# Patient Record
Sex: MALE | Employment: OTHER | URBAN - METROPOLITAN AREA
[De-identification: names, ages, dates, MRNs, and addresses within clinical notes are randomized per-mention and may not be internally consistent; named-entity substitution may affect disease eponyms.]

---

## 2023-10-12 ENCOUNTER — OFFICE VISIT (OUTPATIENT)
Dept: URGENT CARE | Facility: CLINIC | Age: 38
End: 2023-10-12

## 2023-10-12 ENCOUNTER — TELEPHONE (OUTPATIENT)
Dept: OPHTHALMOLOGY | Facility: CLINIC | Age: 38
End: 2023-10-12

## 2023-10-12 VITALS
OXYGEN SATURATION: 97 % | DIASTOLIC BLOOD PRESSURE: 99 MMHG | HEIGHT: 70 IN | BODY MASS INDEX: 26.2 KG/M2 | WEIGHT: 183 LBS | SYSTOLIC BLOOD PRESSURE: 151 MMHG | RESPIRATION RATE: 16 BRPM | HEART RATE: 87 BPM | TEMPERATURE: 99 F

## 2023-10-12 DIAGNOSIS — B02.39 SHINGLES OF EYELID: Primary | ICD-10-CM

## 2023-10-12 PROCEDURE — 99214 OFFICE O/P EST MOD 30 MIN: CPT | Mod: TIER,S$GLB,, | Performed by: PHYSICIAN ASSISTANT

## 2023-10-12 PROCEDURE — 99214 PR OFFICE/OUTPT VISIT, EST, LEVL IV, 30-39 MIN: ICD-10-PCS | Mod: TIER,S$GLB,, | Performed by: PHYSICIAN ASSISTANT

## 2023-10-12 RX ORDER — VALACYCLOVIR HYDROCHLORIDE 1 G/1
1000 TABLET, FILM COATED ORAL 3 TIMES DAILY
Qty: 21 TABLET | Refills: 0 | Status: SHIPPED | OUTPATIENT
Start: 2023-10-12 | End: 2023-10-19

## 2023-10-12 NOTE — PROGRESS NOTES
"Subjective:      Patient ID: Yolie Oswald is a 38 y.o. male.    Vitals:  height is 5' 10" (1.778 m) and weight is 83 kg (182 lb 15.7 oz). His oral temperature is 99 °F (37.2 °C). His blood pressure is 151/99 (abnormal) and his pulse is 87. His respiration is 16 and oxygen saturation is 97%.     Chief Complaint: Eye Problem    Pt complains of left eye pain that started on Sunday. Pt anti-itch cream.  Patient has new vesicles presenting and describes as a burning pain.  He is unable to speak English and is using family member as  does not want to use the language services.     Eye Problem   The left eye is affected. This is a new problem. The current episode started in the past 7 days. The problem occurs constantly. The problem has been unchanged. There was no injury mechanism. The pain is at a severity of 6/10. The pain is moderate. There is No known exposure to pink eye. He Does not wear contacts. Associated symptoms include blurred vision, eye redness and itching. Pertinent negatives include no eye discharge, double vision, fever or photophobia. He has tried nothing for the symptoms. The treatment provided no relief.       Constitution: Negative for chills and fever.   HENT:  Negative for ear pain, ear discharge, tinnitus, hearing loss, dental problem, drooling, mouth sores, tongue pain, tongue lesion, congestion, postnasal drip, sinus pain, sinus pressure, sore throat and trouble swallowing.    Neck: Negative for neck pain.   Cardiovascular:  Negative for chest pain.   Eyes:  Positive for eye itching, eye pain, eye redness, blurred vision and eyelid swelling. Negative for eye trauma, foreign body in eye, eye discharge, photophobia, vision loss and double vision.   Respiratory:  Negative for shortness of breath.    Skin:  Positive for rash and erythema. Negative for wound and bruising.   Neurological:  Negative for dizziness and headaches.    History reviewed. No pertinent past medical " history.    History reviewed. No pertinent surgical history.    History reviewed. No pertinent family history.    Social History     Socioeconomic History    Marital status: Single       Current Outpatient Medications   Medication Sig Dispense Refill    valACYclovir (VALTREX) 1000 MG tablet Take 1 tablet (1,000 mg total) by mouth 3 (three) times daily. for 7 days 21 tablet 0     No current facility-administered medications for this visit.       Review of patient's allergies indicates:  No Known Allergies    Objective:     Physical Exam   Constitutional: He is oriented to person, place, and time. He appears well-developed. He is cooperative.  Non-toxic appearance. He does not appear ill. No distress.   HENT:   Head: Normocephalic and atraumatic.   Ears:   Right Ear: Hearing, tympanic membrane, external ear and ear canal normal. impacted cerumen  Left Ear: Hearing, tympanic membrane and external ear normal. impacted cerumen  Nose: Nose normal. No mucosal edema, rhinorrhea, nasal deformity or congestion. No epistaxis. Right sinus exhibits no maxillary sinus tenderness and no frontal sinus tenderness. Left sinus exhibits no maxillary sinus tenderness and no frontal sinus tenderness.   Mouth/Throat: Uvula is midline, oropharynx is clear and moist and mucous membranes are normal. No trismus in the jaw. Normal dentition. No uvula swelling. No oropharyngeal exudate, posterior oropharyngeal edema or posterior oropharyngeal erythema.   Erythema noted in the left ear canal no vesicles patient states it is not painful.      Comments: Erythema noted in the left ear canal no vesicles patient states it is not painful.  Eyes: Pupils are equal, round, and reactive to light. No visual field deficit is present. Right eye exhibits no chemosis, no discharge, no exudate and no hordeolum. No foreign body present in the right eye. Left eye exhibits no chemosis, no discharge, no exudate and no hordeolum. No foreign body present in the left  eye. Right conjunctiva is not injected. Right conjunctiva has no hemorrhage. Left conjunctiva is injected. Left conjunctiva has no hemorrhage. No scleral icterus. Right pupil is round, reactive and not sluggish. Left pupil is round, reactive and not sluggish. Extraocular movement intact periorbital hyperpigmentation     Comments: Patient is unable to keep eyes open or tolerate the  funduscopic exam.   Neck: Trachea normal and phonation normal. Neck supple. No edema present. No erythema present. No neck rigidity present.   Cardiovascular: Normal rate, regular rhythm, normal heart sounds and normal pulses.   No murmur heard.Exam reveals no gallop and no friction rub.   Pulmonary/Chest: Effort normal and breath sounds normal. No stridor. No respiratory distress. He has no decreased breath sounds. He has no wheezes. He has no rhonchi. He has no rales.   Abdominal: Normal appearance.   Musculoskeletal:      Cervical back: He exhibits no tenderness.   Lymphadenopathy:     He has no cervical adenopathy.   Neurological: He is alert and oriented to person, place, and time. He displays no weakness. No cranial nerve deficit or sensory deficit. He exhibits normal muscle tone. Coordination normal.   Skin: Skin is warm, dry, intact, not diaphoretic, not pale, rash and vesicular. erythema No bruising        Psychiatric: His speech is normal and behavior is normal. Mood, judgment and thought content normal.   Nursing note and vitals reviewed.        Assessment:     1. Shingles of eyelid        Plan:       Shingles of eyelid  -     valACYclovir (VALTREX) 1000 MG tablet; Take 1 tablet (1,000 mg total) by mouth 3 (three) times daily. for 7 days  Dispense: 21 tablet; Refill: 0  -     Ambulatory referral/consult to Ophthalmology    I have reviewed the patient chart and pertinent past imaging/labs.  I called ophthalmology triage nurse and spoke with her patient is going to be seen at 1:00 p.m. for eye exam.  Family understands the  seriousness of getting evaluated by the eye doctor states they will take him over immediately.    Patient Instructions   I have made appointment for you today at 1:00 p.m. at main [facility] on the 10th floor the addresses [address] [place] [address].  Take Valtrex as prescribed.  If your symptoms worsen please go to the ER for evaluation

## 2023-10-12 NOTE — PATIENT INSTRUCTIONS
I have made appointment for you today at 1:00 p.m. at [facility] on the 10th floor the addresses [address] [facility].  Take Valtrex as prescribed.  If your symptoms worsen please go to the ER for evaluation

## 2023-10-16 ENCOUNTER — OFFICE VISIT (OUTPATIENT)
Dept: OPTOMETRY | Facility: CLINIC | Age: 38
End: 2023-10-16

## 2023-10-16 DIAGNOSIS — B02.39 HERPES ZOSTER DERMATITIS OF EYELID: Primary | ICD-10-CM

## 2023-10-16 PROCEDURE — 99999 PR PBB SHADOW E&M-EST. PATIENT-LVL II: CPT | Mod: PBBFAC,,, | Performed by: OPTOMETRIST

## 2023-10-16 PROCEDURE — 99212 OFFICE O/P EST SF 10 MIN: CPT | Mod: PBBFAC,PO | Performed by: OPTOMETRIST

## 2023-10-16 PROCEDURE — 99999 PR PBB SHADOW E&M-EST. PATIENT-LVL II: ICD-10-PCS | Mod: PBBFAC,,, | Performed by: OPTOMETRIST

## 2023-10-16 PROCEDURE — 99203 PR OFFICE/OUTPT VISIT, NEW, LEVL III, 30-44 MIN: ICD-10-PCS | Mod: S$PBB,,, | Performed by: OPTOMETRIST

## 2023-10-16 PROCEDURE — 99203 OFFICE O/P NEW LOW 30 MIN: CPT | Mod: S$PBB,,, | Performed by: OPTOMETRIST

## 2023-10-16 RX ORDER — ERYTHROMYCIN 5 MG/G
OINTMENT OPHTHALMIC 3 TIMES DAILY
Qty: 1 EACH | Refills: 0 | Status: SHIPPED | OUTPATIENT
Start: 2023-10-16 | End: 2023-11-02

## 2023-10-16 NOTE — PROGRESS NOTES
Subjective:       Patient ID: Yolie Oswald is a 38 y.o. male      Chief Complaint   Patient presents with    Eye Problem     History of Present Illness  Dls: never     39 y/o male presents today with c/o x 1 week ago was cutting trees and branches fell on his face that night pain in os fbs os then 2 days after rash on left side of face pain 9-10 swollen lids os photophobia blurry vision os tearing os no mucous. Pt was seen in urgent care x 5 days ago and diagnosed with shingles was started on Valacylovir PO otc gtts os prn.          Assessment/Plan:     1. Herpes zoster dermatitis of eyelid  Pt see in urgent care x 4 days ago with blisters to left side of forehead/face. Diagnosed with shingles and started on valtrex. Pt presents to clinic today with increase in blisters with are scabbing, periorbital eyelid edema causing ectropion and trichasis to OS. No dendrite. Pt to finish full course of valtrex. Romycin viktoriya OS TID to blisters. AT QID OS.           - erythromycin (ROMYCIN) ophthalmic ointment; Place into the left eye 3 (three) times daily.  Dispense: 1 each; Refill: 0    Follow up in about 8 days (around 10/24/2023).

## 2023-10-24 ENCOUNTER — OFFICE VISIT (OUTPATIENT)
Dept: OPTOMETRY | Facility: CLINIC | Age: 38
End: 2023-10-24

## 2023-10-24 DIAGNOSIS — H20.9 UVEITIS OF LEFT EYE: ICD-10-CM

## 2023-10-24 DIAGNOSIS — H16.102 SUPERFICIAL KERATITIS OF LEFT EYE: ICD-10-CM

## 2023-10-24 DIAGNOSIS — B02.39 HERPES ZOSTER DERMATITIS OF EYELID: Primary | ICD-10-CM

## 2023-10-24 PROCEDURE — 99212 OFFICE O/P EST SF 10 MIN: CPT | Mod: PBBFAC,PO | Performed by: OPTOMETRIST

## 2023-10-24 PROCEDURE — 99999 PR PBB SHADOW E&M-EST. PATIENT-LVL II: CPT | Mod: PBBFAC,,, | Performed by: OPTOMETRIST

## 2023-10-24 PROCEDURE — 99213 OFFICE O/P EST LOW 20 MIN: CPT | Mod: S$PBB,,, | Performed by: OPTOMETRIST

## 2023-10-24 PROCEDURE — 99999 PR PBB SHADOW E&M-EST. PATIENT-LVL II: ICD-10-PCS | Mod: PBBFAC,,, | Performed by: OPTOMETRIST

## 2023-10-24 PROCEDURE — 99213 PR OFFICE/OUTPT VISIT, EST, LEVL III, 20-29 MIN: ICD-10-PCS | Mod: S$PBB,,, | Performed by: OPTOMETRIST

## 2023-10-24 RX ORDER — PREDNISOLONE ACETATE 10 MG/ML
SUSPENSION/ DROPS OPHTHALMIC
Qty: 5 ML | Refills: 0 | Status: SHIPPED | OUTPATIENT
Start: 2023-10-24 | End: 2023-11-07

## 2023-10-24 NOTE — PROGRESS NOTES
Subjective:       Patient ID: Yolie Oswald is a 38 y.o. male      Chief Complaint   Patient presents with    Follow-up     History of Present Illness  Dls: 10/16/23 Dr. Abreu     37 y/o male presents today for f/u Herpes Zoster   Pt c/o pain os 3-4 fbs os red os photophobia os blurry vision at distance and near os.     Eye meds  Erythromycin VIKTORIYA TID OS last dose x 1 day in am        Assessment/Plan:     1. Herpes zoster dermatitis of eyelid  Lesions improving. No dendrite. Can continue viktoriya PRN. Pt finished course of oral valtrex    2. Uveitis of left eye  Subsequent uveitis from zoster. +KP and K folds on exam toady. Start PF with 2 week taper.   - prednisoLONE acetate (PRED FORTE) 1 % DrpS; Place 1 drop into the left eye 4 (four) times daily for 7 days, THEN 1 drop 2 (two) times daily for 7 days. Remember to shake the medication before use..  Dispense: 5 mL; Refill: 0     3.Superficial keratitis of left eye  Recommend Refresh/Systane BID - QID OS. Wait 5 minutes between drops.     Follow up in about 9 days (around 11/2/2023).

## 2023-11-02 ENCOUNTER — OFFICE VISIT (OUTPATIENT)
Dept: OPTOMETRY | Facility: CLINIC | Age: 38
End: 2023-11-02

## 2023-11-02 DIAGNOSIS — H20.9 UVEITIS OF LEFT EYE: Primary | ICD-10-CM

## 2023-11-02 DIAGNOSIS — H16.102 SUPERFICIAL KERATITIS OF LEFT EYE: ICD-10-CM

## 2023-11-02 DIAGNOSIS — B02.39 HERPES ZOSTER DERMATITIS OF EYELID: ICD-10-CM

## 2023-11-02 PROCEDURE — 99213 PR OFFICE/OUTPT VISIT, EST, LEVL III, 20-29 MIN: ICD-10-PCS | Mod: S$PBB,,, | Performed by: OPTOMETRIST

## 2023-11-02 PROCEDURE — 99999 PR PBB SHADOW E&M-EST. PATIENT-LVL II: ICD-10-PCS | Mod: PBBFAC,,, | Performed by: OPTOMETRIST

## 2023-11-02 PROCEDURE — 99212 OFFICE O/P EST SF 10 MIN: CPT | Mod: PBBFAC,PO | Performed by: OPTOMETRIST

## 2023-11-02 PROCEDURE — 99213 OFFICE O/P EST LOW 20 MIN: CPT | Mod: S$PBB,,, | Performed by: OPTOMETRIST

## 2023-11-02 PROCEDURE — 99999 PR PBB SHADOW E&M-EST. PATIENT-LVL II: CPT | Mod: PBBFAC,,, | Performed by: OPTOMETRIST

## 2023-11-02 NOTE — PROGRESS NOTES
Subjective:       Patient ID: Yolie Oswald is a 38 y.o. male      Chief Complaint   Patient presents with    Eye Problem     History of Present Illness  HPI    Dls: 10/24/23 Dr. Abreu     37 y/o male presents today for f/u Uveitis   Pt c/o red os itching os blurry vision os.     Eye meds  PF OS 4-5 x's a day   Systane OU PRN        Assessment/Plan:     1. Uveitis of left eye  Resolving. IOP good. No KP/k-folds. Taper PF BID x 1 week OS and then stop    2. Superficial keratitis of left eye  Continue systane QID OS    3. Herpes zoster dermatitis of eyelid  Scabs forming on forehead. Advised not to scratch. Cold compresses PRN.     Follow up if symptoms worsen or fail to improve.